# Patient Record
Sex: MALE | Race: WHITE | NOT HISPANIC OR LATINO | Employment: FULL TIME | URBAN - METROPOLITAN AREA
[De-identification: names, ages, dates, MRNs, and addresses within clinical notes are randomized per-mention and may not be internally consistent; named-entity substitution may affect disease eponyms.]

---

## 2020-09-18 ENCOUNTER — HOSPITAL ENCOUNTER (OUTPATIENT)
Dept: FAMILY MEDICINE CLINIC | Facility: CLINIC | Age: 50
Discharge: HOME | End: 2020-09-18
Attending: NURSE PRACTITIONER

## 2020-09-18 ENCOUNTER — OFFICE VISIT CONVERTED (OUTPATIENT)
Dept: FAMILY MEDICINE CLINIC | Facility: CLINIC | Age: 50
End: 2020-09-18
Attending: NURSE PRACTITIONER

## 2020-09-20 LAB — BACTERIA UR CULT: NORMAL

## 2020-10-22 ENCOUNTER — HOSPITAL ENCOUNTER (OUTPATIENT)
Dept: URGENT CARE | Facility: CLINIC | Age: 50
Discharge: HOME | End: 2020-10-22

## 2021-05-09 VITALS
BODY MASS INDEX: 27.36 KG/M2 | TEMPERATURE: 96.8 F | HEIGHT: 72 IN | WEIGHT: 202 LBS | HEART RATE: 106 BPM | OXYGEN SATURATION: 95 % | SYSTOLIC BLOOD PRESSURE: 130 MMHG | DIASTOLIC BLOOD PRESSURE: 80 MMHG

## 2021-07-02 ENCOUNTER — DOCUMENTATION (OUTPATIENT)
Dept: FAMILY MEDICINE CLINIC | Facility: CLINIC | Age: 51
End: 2021-07-02

## 2021-07-02 ENCOUNTER — TELEPHONE (OUTPATIENT)
Dept: PEDIATRICS | Facility: OTHER | Age: 51
End: 2021-07-02

## 2021-07-02 NOTE — TELEPHONE ENCOUNTER
Caller: Luciano Shields    Relationship to patient: Self    Best call back number: [phone]    Chief complaint: PATIENT CALLED IN NEEDING TO RESCHEDULE AN APPOINTMENT HE HAD FOR YESTERDAY, PATIENT INFORMED ME HE HAD A WOUND ON HIS FINGER THAT HAD A FOUL SMELL AND DEEP HOLES IN IT. PATIENT SAID HE INJURED HIMSELF WITH A DERMEL WHICH HE RECEIVED TREATMENT FOR PREVIOUSLY HAVING SURGERY AND A CAST. HOWEVER THE CAST CAME OFF AND THE STITCHES WERE EXPOSED AND ALSO CAME OFF. DIRECTED PATIENT TO GO TO THE EMERGENCY ROOM WHICH HE AGREED BUT HE ALSO WANTED TO GET A NEW PATIENT APPOINTMENT WITH A PROVIDER TO ESTABLISH CARE FOR THE FUTURE.     Patient directed to call 911 or go to their nearest emergency room.     Patient verbalized understanding: [x] Yes  [] No

## 2021-07-06 ENCOUNTER — TELEPHONE (OUTPATIENT)
Dept: FAMILY MEDICINE CLINIC | Facility: CLINIC | Age: 51
End: 2021-07-06

## 2021-07-06 NOTE — TELEPHONE ENCOUNTER
Caller: Luciano Shields    Relationship: Self    Best call back number: [phone]     What is the best time to reach you: ANY    What was the call regarding: PT WENT TO THE [facility], RECEIVED ANTIBIOTICS AND MEDS FOR HIS FINGER, WOULD LIKE A CALL IF HE CAN BE BROUGHT IN FOR AN APPT SOONER THAN THE 22ND, PLEASE ADVISE, THANK YOU.    Do you require a callback:YES